# Patient Record
(demographics unavailable — no encounter records)

---

## 2024-10-09 NOTE — HISTORY OF PRESENT ILLNESS
[de-identified] : fu cough [FreeTextEntry6] : 1 yo M here for follow up of cough. Now s/p 5 days orapred; however, spit a lot of it out. Also has been using Flovent and albuterol. He has been improved overall and active. PO intake normal. Has slight residual cough.

## 2024-10-09 NOTE — DISCUSSION/SUMMARY
[FreeTextEntry1] : 1 yo M with improved cough without wheeze today.  - continue flovent for another 4-5 days  - monitor for worsening of symptoms and return if necessary  All questions addressed.

## 2024-10-28 NOTE — HISTORY OF PRESENT ILLNESS
[Mother] : mother [Fruit] : fruit [Vegetables] : vegetables [Meat] : meat [Eggs] : eggs [Fish] : fish [Dairy] : dairy [Vitamin] : Patient takes vitamin daily [___ stools per day] : [unfilled]  stools per day [___ voids per day] : [unfilled] voids per day [Normal] : Normal [In bed] : In bed [Brushing teeth] : Brushing teeth [Yes] : Patient goes to dentist yearly [None] : Primary Fluoride Source: None [In nursery school] : In nursery school [Playtime (60 min/d)] : Playtime 60 min a day [< 2 hrs of screen time] : Less than 2 hrs of screen time [Appropiate parent-child communication] : Appropriate parent-child communication [Child given choices] : Child given choices [Child Cooperates] : Child cooperates [Parent has appropriate responses to behavior] : Parent has appropriate responses to behavior [No] : Not at  exposure [Water heater temperature set at <120 degrees F] : Water heater temperature set at <120 degrees F [Car seat in back seat] : Car seat in back seat [Smoke Detectors] : Smoke detectors [Supervised play near cars and streets] : Supervised play near cars and streets [Carbon Monoxide Detectors] : Carbon monoxide detectors [Exposure to electronic nicotine delivery system] : Exposure to electronic nicotine delivery system [NO] : No [FreeTextEntry7] : ENT hoarse voice, choking on water resolved. [de-identified] : dairy-yogurt milk full fat, veg and fruit, turkey, noodles, mac and cheese.  No red meat or chicken.  Toast, avocado.  Picky.   [FreeTextEntry3] : Sleeps 12 hr, no naps. [FreeTextEntry9] : 3 d/wk [employer]. [de-identified] : Flu [FreeTextEntry1] : 3 yo well male here for physical and Flu vaccination.   Has apt with ENT- still has hoarse voice- was choking on liquids- this has resolved. Hx RSV bronchiolitis 10/22, 01/25/23 ER for resp distress, RAD- Switched from Asmanex (difficulty with sleep) to Flovent PRN  10/03/24 RAD/cough treated with Prednisolone x 5 d, Improved on 10/09/24 recheck  Hx perioral dermatitis- Metronidazole Cr BID x 6 wk resolved. 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

## 2024-10-28 NOTE — PHYSICAL EXAM
[Alert] : alert [No Acute Distress] : no acute distress [Playful] : playful [Normocephalic] : normocephalic [Conjunctivae with no discharge] : conjunctivae with no discharge [PERRL] : PERRL [EOMI Bilateral] : EOMI bilateral [Auricles Well Formed] : auricles well formed [Clear Tympanic membranes with present light reflex and bony landmarks] : clear tympanic membranes with present light reflex and bony landmarks [No Discharge] : no discharge [Nares Patent] : nares patent [Pink Nasal Mucosa] : pink nasal mucosa [Palate Intact] : palate intact [Uvula Midline] : uvula midline [Nonerythematous Oropharynx] : nonerythematous oropharynx [No Caries] : no caries [Trachea Midline] : trachea midline [Supple, full passive range of motion] : supple, full passive range of motion [No Palpable Masses] : no palpable masses [Symmetric Chest Rise] : symmetric chest rise [Clear to Auscultation Bilaterally] : clear to auscultation bilaterally [Normoactive Precordium] : normoactive precordium [Regular Rate and Rhythm] : regular rate and rhythm [Normal S1, S2 present] : normal S1, S2 present [No Murmurs] : no murmurs [+2 Femoral Pulses] : +2 femoral pulses [Soft] : soft [NonTender] : non tender [Non Distended] : non distended [Normoactive Bowel Sounds] : normoactive bowel sounds [No Hepatomegaly] : no hepatomegaly [No Splenomegaly] : no splenomegaly [Jose Cruz 1] : Jose Cruz 1 [Central Urethral Opening] : central urethral opening [Testicles Descended Bilaterally] : testicles descended bilaterally [Patent] : patent [Normally Placed] : normally placed [No Abnormal Lymph Nodes Palpated] : no abnormal lymph nodes palpated [Symmetric Buttocks Creases] : symmetric buttocks creases [Symmetric Hip Rotation] : symmetric hip rotation [No Gait Asymmetry] : no gait asymmetry [No pain or deformities with palpation of bone, muscles, joints] : no pain or deformities with palpation of bone, muscles, joints [Normal Muscle Tone] : normal muscle tone [No Spinal Dimple] : no spinal dimple [NoTuft of Hair] : no tuft of hair [Straight] : straight [+2 Patella DTR] : +2 patella DTR [Cranial Nerves Grossly Intact] : cranial nerves grossly intact [No Rash or Lesions] : no rash or lesions [Circumcised] : circumcised [de-identified] : left 2 y molar cavity.  Lip licking dermatitis [FreeTextEntry6] : mild right sided penile adhesions

## 2024-10-28 NOTE — PHYSICAL EXAM
[Alert] : alert [No Acute Distress] : no acute distress [Playful] : playful [Normocephalic] : normocephalic [Conjunctivae with no discharge] : conjunctivae with no discharge [PERRL] : PERRL [EOMI Bilateral] : EOMI bilateral [Auricles Well Formed] : auricles well formed [Clear Tympanic membranes with present light reflex and bony landmarks] : clear tympanic membranes with present light reflex and bony landmarks [No Discharge] : no discharge [Nares Patent] : nares patent [Pink Nasal Mucosa] : pink nasal mucosa [Palate Intact] : palate intact [Uvula Midline] : uvula midline [Nonerythematous Oropharynx] : nonerythematous oropharynx [No Caries] : no caries [Trachea Midline] : trachea midline [Supple, full passive range of motion] : supple, full passive range of motion [No Palpable Masses] : no palpable masses [Symmetric Chest Rise] : symmetric chest rise [Clear to Auscultation Bilaterally] : clear to auscultation bilaterally [Normoactive Precordium] : normoactive precordium [Regular Rate and Rhythm] : regular rate and rhythm [Normal S1, S2 present] : normal S1, S2 present [No Murmurs] : no murmurs [+2 Femoral Pulses] : +2 femoral pulses [Soft] : soft [NonTender] : non tender [Non Distended] : non distended [Normoactive Bowel Sounds] : normoactive bowel sounds [No Hepatomegaly] : no hepatomegaly [No Splenomegaly] : no splenomegaly [Jose Cruz 1] : Jose Cruz 1 [Central Urethral Opening] : central urethral opening [Testicles Descended Bilaterally] : testicles descended bilaterally [Patent] : patent [Normally Placed] : normally placed [No Abnormal Lymph Nodes Palpated] : no abnormal lymph nodes palpated [Symmetric Buttocks Creases] : symmetric buttocks creases [Symmetric Hip Rotation] : symmetric hip rotation [No Gait Asymmetry] : no gait asymmetry [No pain or deformities with palpation of bone, muscles, joints] : no pain or deformities with palpation of bone, muscles, joints [Normal Muscle Tone] : normal muscle tone [No Spinal Dimple] : no spinal dimple [NoTuft of Hair] : no tuft of hair [Straight] : straight [+2 Patella DTR] : +2 patella DTR [Cranial Nerves Grossly Intact] : cranial nerves grossly intact [No Rash or Lesions] : no rash or lesions [Circumcised] : circumcised [de-identified] : left 2 y molar cavity.  Lip licking dermatitis [FreeTextEntry6] : mild right sided penile adhesions

## 2024-10-28 NOTE — DEVELOPMENTAL MILESTONES
[Normal Development] : Normal Development [None] : none [Yes] : Completed. [Goes to the bathroom and urinates] : goes to bathroom and urinates by self [Plays and shares with others] : plays and shares with others [Put on coat, jacket, or shirt by self] : puts on coat, jacket, or shirt by self [Begins to play make-believe] : begins to play make-believe [Eats independently] : eats independently [Uses 3-word sentences] : uses 3-word sentences [Uses words that are 75% intelligible] : uses words that are 75% intelligible to strangers [Understands simple prepositions] : understands simple prepositions [Tells a story from a book or TV] : tells a story from a book or TV [Compares things using words such] : compares things using words such as bigger or shorter [Pedals tricycle] : pedals tricycle [Climbs on and off couch] : climbs on and off couch or chair [Jumps forward] : jumps forward [Draws a single [place]] : draws a single [place] [Draws a person with head] : draws a person with head and one other body part [Cuts with child scissor] : cuts with child scissor

## 2024-10-28 NOTE — DISCUSSION/SUMMARY
[Normal Growth] : growth [Normal Development] : development [No Elimination Concerns] : elimination [No Skin Concerns] : skin [Normal Sleep Pattern] : sleep [Family Support] : family support [Encouraging Literacy Activities] : encouraging literacy activities [Playing with Peers] : playing with peers [Promoting Physical Activity] : promoting physical activity [Safety] : safety [No Medications] : ~He/She~ is not on any medications [Parent/Guardian] : parent/guardian [] : The components of the vaccine(s) to be administered today are listed in the plan of care. The disease(s) for which the vaccine(s) are intended to prevent and the risks have been discussed with the caretaker.  The risks are also included in the appropriate vaccination information statements which have been provided to the patient's caregiver.  The caregiver has given consent to vaccinate. [FreeTextEntry4] : Penile adhesions- Triamcinolone Cr QD until detaches. [de-identified] : picky-keep offering variety [FreeTextEntry1] : 3 yo well male here for physical and Flu vaccination.   Has apt with ENT- still has hoarse voice- was choking on liquids- this has resolved. Hx RSV bronchiolitis 10/22, 01/25/23 ER for resp distress, RAD- Switched from Asmanex (difficulty with sleep) to Flovent PRN  10/03/24 RAD/cough treated with Prednisolone x 5 d, Improved on 10/09/24 recheck  Hx perioral dermatitis- Metronidazole Cr BID x 6 wk resolved. 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 Lip licking dermatitis- Vaseline or Aquaphor.  Go Check Kids- passed. SWYC-passed. Lead screen reviewed. Oral risk health assessment- reviewed.  Anticipatory guidance given.  Flu given.  Continue balanced diet with all food groups. Brush teeth twice a day with toothbrush. Recommend visit to dentist. As per car seat 's guidelines, use foward-facing car seat in back seat of car. Switch to booster seat when child reaches highest weight/height for seat. Put toddler to sleep in own bed. Help toddler to maintain consistent daily routines and sleep schedule. Pre-K discussed. Ensure home is safe. Use consistent, positive discipline. Read aloud to toddler. Limit screen time to no more than 2 hours per day. Return for well child check in 1 year.

## 2024-10-28 NOTE — DEVELOPMENTAL MILESTONES
[Normal Development] : Normal Development [None] : none [Yes] : Completed. [Goes to the bathroom and urinates] : goes to bathroom and urinates by self [Plays and shares with others] : plays and shares with others [Put on coat, jacket, or shirt by self] : puts on coat, jacket, or shirt by self [Begins to play make-believe] : begins to play make-believe [Eats independently] : eats independently [Uses 3-word sentences] : uses 3-word sentences [Uses words that are 75% intelligible] : uses words that are 75% intelligible to strangers [Understands simple prepositions] : understands simple prepositions [Tells a story from a book or TV] : tells a story from a book or TV [Compares things using words such] : compares things using words such as bigger or shorter [Pedals tricycle] : pedals tricycle [Climbs on and off couch] : climbs on and off couch or chair [Jumps forward] : jumps forward [Draws a single [place]] : draws a single [place] [Draws a person with head] : draws a person with head and one other body part [Cuts with child scissor] : cuts with child scissor

## 2024-10-28 NOTE — HISTORY OF PRESENT ILLNESS
[Mother] : mother [Fruit] : fruit [Vegetables] : vegetables [Meat] : meat [Eggs] : eggs [Fish] : fish [Dairy] : dairy [Vitamin] : Patient takes vitamin daily [___ stools per day] : [unfilled]  stools per day [___ voids per day] : [unfilled] voids per day [Normal] : Normal [In bed] : In bed [Brushing teeth] : Brushing teeth [Yes] : Patient goes to dentist yearly [None] : Primary Fluoride Source: None [In nursery school] : In nursery school [Playtime (60 min/d)] : Playtime 60 min a day [< 2 hrs of screen time] : Less than 2 hrs of screen time [Appropiate parent-child communication] : Appropriate parent-child communication [Child given choices] : Child given choices [Child Cooperates] : Child cooperates [Parent has appropriate responses to behavior] : Parent has appropriate responses to behavior [No] : Not at  exposure [Water heater temperature set at <120 degrees F] : Water heater temperature set at <120 degrees F [Car seat in back seat] : Car seat in back seat [Smoke Detectors] : Smoke detectors [Supervised play near cars and streets] : Supervised play near cars and streets [Carbon Monoxide Detectors] : Carbon monoxide detectors [Exposure to electronic nicotine delivery system] : Exposure to electronic nicotine delivery system [NO] : No [FreeTextEntry7] : ENT hoarse voice, choking on water resolved. [de-identified] : dairy-yogurt milk full fat, veg and fruit, turkey, noodles, mac and cheese.  No red meat or chicken.  Toast, avocado.  Picky.   [FreeTextEntry3] : Sleeps 12 hr, no naps. [FreeTextEntry9] : 3 d/wk [employer]. [de-identified] : Flu [FreeTextEntry1] : 3 yo well male here for physical and Flu vaccination.   Has apt with ENT- still has hoarse voice- was choking on liquids- this has resolved. Hx RSV bronchiolitis 10/22, 01/25/23 ER for resp distress, RAD- Switched from Asmanex (difficulty with sleep) to Flovent PRN  10/03/24 RAD/cough treated with Prednisolone x 5 d, Improved on 10/09/24 recheck  Hx perioral dermatitis- Metronidazole Cr BID x 6 wk resolved. 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

## 2024-10-28 NOTE — DISCUSSION/SUMMARY
[Normal Growth] : growth [Normal Development] : development [No Elimination Concerns] : elimination [No Skin Concerns] : skin [Normal Sleep Pattern] : sleep [Family Support] : family support [Encouraging Literacy Activities] : encouraging literacy activities [Playing with Peers] : playing with peers [Promoting Physical Activity] : promoting physical activity [Safety] : safety [No Medications] : ~He/She~ is not on any medications [Parent/Guardian] : parent/guardian [] : The components of the vaccine(s) to be administered today are listed in the plan of care. The disease(s) for which the vaccine(s) are intended to prevent and the risks have been discussed with the caretaker.  The risks are also included in the appropriate vaccination information statements which have been provided to the patient's caregiver.  The caregiver has given consent to vaccinate. [FreeTextEntry4] : Penile adhesions- Triamcinolone Cr QD until detaches. [de-identified] : picky-keep offering variety [FreeTextEntry1] : 3 yo well male here for physical and Flu vaccination.   Has apt with ENT- still has hoarse voice- was choking on liquids- this has resolved. Hx RSV bronchiolitis 10/22, 01/25/23 ER for resp distress, RAD- Switched from Asmanex (difficulty with sleep) to Flovent PRN  10/03/24 RAD/cough treated with Prednisolone x 5 d, Improved on 10/09/24 recheck  Hx perioral dermatitis- Metronidazole Cr BID x 6 wk resolved. 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 Lip licking dermatitis- Vaseline or Aquaphor.  Go Check Kids- passed. SWYC-passed. Lead screen reviewed. Oral risk health assessment- reviewed.  Anticipatory guidance given.  Flu given.  Continue balanced diet with all food groups. Brush teeth twice a day with toothbrush. Recommend visit to dentist. As per car seat 's guidelines, use foward-facing car seat in back seat of car. Switch to booster seat when child reaches highest weight/height for seat. Put toddler to sleep in own bed. Help toddler to maintain consistent daily routines and sleep schedule. Pre-K discussed. Ensure home is safe. Use consistent, positive discipline. Read aloud to toddler. Limit screen time to no more than 2 hours per day. Return for well child check in 1 year.

## 2025-01-18 NOTE — HISTORY OF PRESENT ILLNESS
[GI Symptoms] : GI SYMPTOMS [Nonbilious] : nonbilious [___ Day(s)] : [unfilled] day(s) [At Night] : at night [In Morning] : in morning [Fever] : fever [URI symptoms] : URI symptoms [Decreased Appetite] : decreased appetite [Vomiting] : vomiting [Stable] : stable [Hx of recent COVID-19 infection] : no history of recent COVID-19 infection [Sick Contacts: ___] : no sick contacts [Change in diet] : no change in diet [Ate out] : did not eat out [Recent travel: ___] : no recent travel [Recent Antibiotic Use] : no recent antibiotic use [Known Exposure to COVID-19] : no known exposure to COVID-19 [Weight loss] : no weight loss [Thirsty] : not thirsty [Dry Lips] : no dry lips [Nausea] : no nausea [Diarrhea] : no diarrhea [Constipation] : no constipation [Abdominal Pain] : no abdominal pain [Decreased Urine Output] : no decreased urine output [Rash] : no rash

## 2025-03-06 NOTE — DISCUSSION/SUMMARY
[FreeTextEntry1] : 3 yo M positive for COVID on rapid testing with cough/congestion. Exam reassuring. - initiate Flovent 2 puffs BID  - albuterol q4 for today only and wean down to PRN  - provide adequate fluid intake to remain well hydrated i.e tea with honey, soups, smoothies, ice pops - alternate Tylenol and Motrin as needed for potential fever  - warm steam baths to help clear up some of the congestion  * Flu panel sent. Declined strep testing.  Parent verbalized understanding and all questions addressed. Return to the office if symptoms worsen. Return precautions provided regarding signs of severe respiratory distress.

## 2025-03-06 NOTE — HISTORY OF PRESENT ILLNESS
[de-identified] : cough, runny nose [FreeTextEntry6] : 3 yo M with nasal congestion and cough this morning. Positive covid exposure at home. No difficulty breathing today. Mother has not initiated use of inhalers yet. No true fever, sore throat, ear pain, abdo pain. PO intake normal and playing as usual.

## 2025-03-31 NOTE — PHYSICAL EXAM
[NL] : no acute distress, alert [Moves All Extremities x 4] : moves all extremities x4 [Warm, Well Perfused x4] : warm, well perfused x4 [Capillary Refill <2s] : capillary refill < 2s [de-identified] : restriction of motion with extension of Lt leg, 2 inch circular abrasion on Lt knee over patella with surrounding ecchymosis, no swelling of Lt knee. Small abrasion of Rt knee

## 2025-03-31 NOTE — HISTORY OF PRESENT ILLNESS
[de-identified] : knee injury [FreeTextEntry6] : Fell off of scooter 2 days ago (mother not home to witness exactly what happened and was with Dad who did not see exactly how he fell). Since falling has said knee hurts and has been refusing to walk on it. Father straightened leg once but otherwise Fer does not want to straighten it and bear weight. He says it hurts over the cut. No significant swelling. Only mild scrape on Rt knee but otherwise did not sustain injuries anywhere else.

## 2025-03-31 NOTE — DISCUSSION/SUMMARY
[FreeTextEntry1] : 3 yo M with abrasion and injury to Lt knee with refusal to extend leg and bear weight. Exam overall reassuring but will do XR to confirm no injury.  - rest, ice, and elevate the leg for now as much as possible - clean wound and apply abx ointment and bandaid to promote healing and prevent infection  All questions addressed.

## 2025-05-06 NOTE — DISCUSSION/SUMMARY
[FreeTextEntry1] : 3 yo male with abdominal pain, vomiting, b/l ear pain, sore throat, nevus left scalp.  Qs neg.  Throat Cx sent.   Increase fluids slowly, Pedialyte, ices, monitor UO, slowly advance to bland diet, no dairy, no greasy, fried or fatty foods.  Tylenol PRN pain. Peds derm referral for scalp nevus.

## 2025-05-06 NOTE — REVIEW OF SYSTEMS
[Appetite Changes] : appetite changes [Vomiting] : vomiting [Abdominal Pain] : abdominal pain [Negative] : Genitourinary [Ear Pain] : ear pain [Sore Throat] : sore throat [Headache] : no headache [Nasal Discharge] : no nasal discharge [Nasal Congestion] : no nasal congestion [Diarrhea] : no diarrhea

## 2025-05-06 NOTE — HISTORY OF PRESENT ILLNESS
[de-identified] : vomiting [FreeTextEntry6] : Last night started c/o diffuse abdominal pain, did not want breakfast this AM, ate a smoothie ice pop and went to school, 10:45 AM school called he vomited 3x.  1 PM vomited again.  c/o periumbilical SA.  c/o ST pain before and b/l ear pain.  No fever, body aches or chills.  No fatigue.  No HA, no runny or stuffy nose, no cough, no SOB or chest pain.  No diarrhea, decreased appetite.  No rash.  Mother noticed a nevus on his left scalp-seems darker than she saw in the past- derm referral.

## 2025-05-06 NOTE — HISTORY OF PRESENT ILLNESS
[de-identified] : vomiting [FreeTextEntry6] : Last night started c/o diffuse abdominal pain, did not want breakfast this AM, ate a smoothie ice pop and went to school, 10:45 AM school called he vomited 3x.  1 PM vomited again.  c/o periumbilical SA.  c/o ST pain before and b/l ear pain.  No fever, body aches or chills.  No fatigue.  No HA, no runny or stuffy nose, no cough, no SOB or chest pain.  No diarrhea, decreased appetite.  No rash.  Mother noticed a nevus on his left scalp-seems darker than she saw in the past- derm referral.